# Patient Record
Sex: MALE | Race: WHITE | NOT HISPANIC OR LATINO | URBAN - METROPOLITAN AREA
[De-identification: names, ages, dates, MRNs, and addresses within clinical notes are randomized per-mention and may not be internally consistent; named-entity substitution may affect disease eponyms.]

---

## 2017-06-29 ENCOUNTER — EMERGENCY (EMERGENCY)
Facility: HOSPITAL | Age: 37
LOS: 1 days | Discharge: HOME | End: 2017-06-29
Attending: EMERGENCY MEDICINE | Admitting: EMERGENCY MEDICINE
Payer: SELF-PAY

## 2017-06-29 VITALS
DIASTOLIC BLOOD PRESSURE: 93 MMHG | RESPIRATION RATE: 18 BRPM | HEART RATE: 69 BPM | TEMPERATURE: 99 F | WEIGHT: 190.04 LBS | SYSTOLIC BLOOD PRESSURE: 134 MMHG | OXYGEN SATURATION: 98 %

## 2017-06-29 VITALS
DIASTOLIC BLOOD PRESSURE: 88 MMHG | SYSTOLIC BLOOD PRESSURE: 126 MMHG | OXYGEN SATURATION: 99 % | HEART RATE: 88 BPM | TEMPERATURE: 98 F | RESPIRATION RATE: 18 BRPM
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
## 2017-06-29 DIAGNOSIS — R07.89 OTHER CHEST PAIN: ICD-10-CM

## 2017-06-29 DIAGNOSIS — S01.312D LACERATION WITHOUT FOREIGN BODY OF LEFT EAR, SUBSEQUENT ENCOUNTER: ICD-10-CM

## 2017-06-29 DIAGNOSIS — Z88.0 ALLERGY STATUS TO PENICILLIN: ICD-10-CM

## 2017-06-29 DIAGNOSIS — Z72.0 TOBACCO USE: ICD-10-CM

## 2017-06-29 LAB
ANION GAP SERPL CALC-SCNC: 15 MMOL/L — SIGNIFICANT CHANGE UP (ref 5–17)
BASOPHILS NFR BLD AUTO: 0.6 % — SIGNIFICANT CHANGE UP (ref 0–2)
BUN SERPL-MCNC: 18 MG/DL — SIGNIFICANT CHANGE UP (ref 7–23)
CALCIUM SERPL-MCNC: 9.9 MG/DL — SIGNIFICANT CHANGE UP (ref 8.4–10.5)
CHLORIDE SERPL-SCNC: 100 MMOL/L — SIGNIFICANT CHANGE UP (ref 96–108)
CO2 SERPL-SCNC: 24 MMOL/L — SIGNIFICANT CHANGE UP (ref 22–31)
CREAT SERPL-MCNC: 0.9 MG/DL — SIGNIFICANT CHANGE UP (ref 0.5–1.3)
D DIMER BLD IA.RAPID-MCNC: <150 NG/ML DDU — SIGNIFICANT CHANGE UP
EOSINOPHIL NFR BLD AUTO: 3.2 % — SIGNIFICANT CHANGE UP (ref 0–6)
GLUCOSE SERPL-MCNC: 89 MG/DL — SIGNIFICANT CHANGE UP (ref 70–99)
HCT VFR BLD CALC: 44.2 % — SIGNIFICANT CHANGE UP (ref 39–50)
HGB BLD-MCNC: 15.3 G/DL — SIGNIFICANT CHANGE UP (ref 13–17)
LYMPHOCYTES # BLD AUTO: 33.4 % — SIGNIFICANT CHANGE UP (ref 13–44)
MCHC RBC-ENTMCNC: 30.5 PG — SIGNIFICANT CHANGE UP (ref 27–34)
MCHC RBC-ENTMCNC: 34.6 G/DL — SIGNIFICANT CHANGE UP (ref 32–36)
MCV RBC AUTO: 88.2 FL — SIGNIFICANT CHANGE UP (ref 80–100)
MONOCYTES NFR BLD AUTO: 8.1 % — SIGNIFICANT CHANGE UP (ref 2–14)
NEUTROPHILS NFR BLD AUTO: 54.7 % — SIGNIFICANT CHANGE UP (ref 43–77)
PLATELET # BLD AUTO: 247 K/UL — SIGNIFICANT CHANGE UP (ref 150–400)
POTASSIUM SERPL-MCNC: 4.6 MMOL/L — SIGNIFICANT CHANGE UP (ref 3.5–5.3)
POTASSIUM SERPL-SCNC: 4.6 MMOL/L — SIGNIFICANT CHANGE UP (ref 3.5–5.3)
RBC # BLD: 5.01 M/UL — SIGNIFICANT CHANGE UP (ref 4.2–5.8)
RBC # FLD: 13.2 % — SIGNIFICANT CHANGE UP (ref 10.3–16.9)
SODIUM SERPL-SCNC: 139 MMOL/L — SIGNIFICANT CHANGE UP (ref 135–145)
WBC # BLD: 6.6 K/UL — SIGNIFICANT CHANGE UP (ref 3.8–10.5)
WBC # FLD AUTO: 6.6 K/UL — SIGNIFICANT CHANGE UP (ref 3.8–10.5)

## 2017-06-29 PROCEDURE — 80048 BASIC METABOLIC PNL TOTAL CA: CPT

## 2017-06-29 PROCEDURE — 85379 FIBRIN DEGRADATION QUANT: CPT

## 2017-06-29 PROCEDURE — 99284 EMERGENCY DEPT VISIT MOD MDM: CPT | Mod: 25

## 2017-06-29 PROCEDURE — 93005 ELECTROCARDIOGRAM TRACING: CPT

## 2017-06-29 PROCEDURE — 93010 ELECTROCARDIOGRAM REPORT: CPT

## 2017-06-29 PROCEDURE — 71020: CPT | Mod: 26

## 2017-06-29 PROCEDURE — 85025 COMPLETE CBC W/AUTO DIFF WBC: CPT

## 2017-06-29 PROCEDURE — 99285 EMERGENCY DEPT VISIT HI MDM: CPT | Mod: 25

## 2017-06-29 PROCEDURE — 71275 CT ANGIOGRAPHY CHEST: CPT | Mod: 26

## 2017-06-29 PROCEDURE — 71275 CT ANGIOGRAPHY CHEST: CPT

## 2017-06-29 PROCEDURE — 71046 X-RAY EXAM CHEST 2 VIEWS: CPT

## 2017-06-29 RX ORDER — GABAPENTIN 400 MG/1
0 CAPSULE ORAL
Qty: 0 | Refills: 0 | COMMUNITY

## 2017-06-29 RX ORDER — ALPRAZOLAM 0.25 MG
0 TABLET ORAL
Qty: 0 | Refills: 0 | COMMUNITY

## 2017-06-29 RX ORDER — ALPRAZOLAM 0.25 MG
0.5 TABLET ORAL ONCE
Qty: 0 | Refills: 0 | Status: DISCONTINUED | OUTPATIENT
Start: 2017-06-29 | End: 2017-06-29

## 2017-06-29 RX ORDER — HYDROCODONE BITARTRATE 50 MG/1
0 CAPSULE, EXTENDED RELEASE ORAL
Qty: 0 | Refills: 0 | COMMUNITY

## 2017-06-29 RX ORDER — IBUPROFEN 200 MG
600 TABLET ORAL ONCE
Qty: 0 | Refills: 0 | Status: COMPLETED | OUTPATIENT
Start: 2017-06-29 | End: 2017-06-29

## 2017-06-29 RX ADMIN — Medication 600 MILLIGRAM(S): at 09:31

## 2017-06-29 NOTE — ED ADULT NURSE NOTE - ADDITIONAL PRINTED INSTRUCTIONS GIVEN
Verbalized understanding of all d/c instructions, pt to followup with PMD and return for worsening symptoms

## 2017-06-29 NOTE — ED PROVIDER NOTE - PROGRESS NOTE DETAILS
4 stitches to left upper pinna patricia last saturday 6 days ago Pt had 3 stitches placed to left superior pinna in [place] ([place]) 6 days ago (s/p accidental cut on a sharp surface)and would like the stitches removed. Wound healing well. No signs of infection or discharge. 3 stitches removed without complication.

## 2017-06-29 NOTE — ED PROVIDER NOTE - CONDUCTED A DETAILED DISCUSSION WITH PATIENT AND/OR GUARDIAN REGARDING, MDM
radiology results/need for outpatient follow-up/return to ED if symptoms worsen, persist or questions arise/lab results/need for surgery

## 2017-06-29 NOTE — ED PROVIDER NOTE - SKIN, MLM
Skin normal color for race, warm, dry and intact. No evidence of rash. healing wound noted to left superior pinna with 3 stitches in place. No erythema/ discharge/ swelling or signs of infection.

## 2017-06-29 NOTE — ED PROVIDER NOTE - OBJECTIVE STATEMENT
36 yo male with hx of anxiety, panic attacks, chronic LBP secondary to lumbar spine fracture at work years ago, presents with right sided chest pain that started while he was drinking coffee at home just PTA. Pain is described as sharp and worsens with deep breaths and associated with SOB. Pt also c/o feeling anxious and states "this could be because of panic" and states that sxs are similar to past panic attacks but today's symptoms are worse than in the past. Also c/o dry throat. No risk factors for VTE, no prolonged immobilization or prior hx or family hx of VTE. No leg pain or swelling. No other complaints. 38 yo male with hx of anxiety, panic attacks, chronic LBP secondary to lumbar spine fracture at work years ago, presents with right sided chest pain that started while he was drinking coffee at home just PTA. Pain is described as sharp and worsens with deep breaths and associated with SOB. Pt also c/o feeling anxious and states "this could be because of panic" and states that sxs are similar to past panic attacks but today's symptoms are worse than in the past. Also c/o dry throat. Pt took a flight from [place] (approx 8 hours) a week ago but states he was walking around periodically on the flight. No leg pain or swelling. No other complaints. No cough/ fevers/ chills.

## 2017-06-29 NOTE — ED ADULT TRIAGE NOTE - CHIEF COMPLAINT QUOTE
right sided chest discomfort. Patient reports "I'm having a panic attack and I can't breath" Reports hx of anxiety. Patient speaking in full sentences.

## 2017-06-29 NOTE — ED PROVIDER NOTE - MEDICAL DECISION MAKING DETAILS
36 yo male presents with right sided chest pain that worsens with deep breaths. Labs/ studies noted. EKG WNL. CTA with no PE. Pt feeling better post pain meds. ?MSK etiology. Advised pain meds prn. Incidental CT findings of possible pericardial cyst discussed and pt to f/up outpt.

## 2017-07-22 ENCOUNTER — EMERGENCY (EMERGENCY)
Facility: HOSPITAL | Age: 37
LOS: 1 days | Discharge: HOME | End: 2017-07-22
Attending: EMERGENCY MEDICINE | Admitting: EMERGENCY MEDICINE
Payer: OTHER MISCELLANEOUS

## 2017-07-22 VITALS
OXYGEN SATURATION: 97 % | RESPIRATION RATE: 18 BRPM | DIASTOLIC BLOOD PRESSURE: 74 MMHG | SYSTOLIC BLOOD PRESSURE: 148 MMHG | HEART RATE: 72 BPM

## 2017-07-22 VITALS
SYSTOLIC BLOOD PRESSURE: 133 MMHG | WEIGHT: 195.11 LBS | HEART RATE: 98 BPM | OXYGEN SATURATION: 100 % | TEMPERATURE: 102 F | DIASTOLIC BLOOD PRESSURE: 84 MMHG | RESPIRATION RATE: 20 BRPM | HEIGHT: 68 IN

## 2017-07-22 DIAGNOSIS — L53.9 ERYTHEMATOUS CONDITION, UNSPECIFIED: ICD-10-CM

## 2017-07-22 DIAGNOSIS — M54.5 LOW BACK PAIN: ICD-10-CM

## 2017-07-22 DIAGNOSIS — Z79.899 OTHER LONG TERM (CURRENT) DRUG THERAPY: ICD-10-CM

## 2017-07-22 DIAGNOSIS — Z79.02 LONG TERM (CURRENT) USE OF ANTITHROMBOTICS/ANTIPLATELETS: ICD-10-CM

## 2017-07-22 DIAGNOSIS — Z88.0 ALLERGY STATUS TO PENICILLIN: ICD-10-CM

## 2017-07-22 DIAGNOSIS — R50.9 FEVER, UNSPECIFIED: ICD-10-CM

## 2017-07-22 DIAGNOSIS — Z79.891 LONG TERM (CURRENT) USE OF OPIATE ANALGESIC: ICD-10-CM

## 2017-07-22 LAB
ALBUMIN SERPL ELPH-MCNC: 4.4 G/DL — SIGNIFICANT CHANGE UP (ref 3.3–5)
ALP SERPL-CCNC: 53 U/L — SIGNIFICANT CHANGE UP (ref 40–120)
ALT FLD-CCNC: 47 U/L — HIGH (ref 10–45)
ANION GAP SERPL CALC-SCNC: 13 MMOL/L — SIGNIFICANT CHANGE UP (ref 5–17)
APPEARANCE UR: CLEAR — SIGNIFICANT CHANGE UP
APTT BLD: 31.2 SEC — SIGNIFICANT CHANGE UP (ref 27.5–37.4)
AST SERPL-CCNC: 27 U/L — SIGNIFICANT CHANGE UP (ref 10–40)
BASOPHILS NFR BLD AUTO: 0.2 % — SIGNIFICANT CHANGE UP (ref 0–2)
BILIRUB SERPL-MCNC: 0.2 MG/DL — SIGNIFICANT CHANGE UP (ref 0.2–1.2)
BILIRUB UR-MCNC: NEGATIVE — SIGNIFICANT CHANGE UP
BUN SERPL-MCNC: 12 MG/DL — SIGNIFICANT CHANGE UP (ref 7–23)
CALCIUM SERPL-MCNC: 9.3 MG/DL — SIGNIFICANT CHANGE UP (ref 8.4–10.5)
CHLORIDE SERPL-SCNC: 98 MMOL/L — SIGNIFICANT CHANGE UP (ref 96–108)
CO2 SERPL-SCNC: 26 MMOL/L — SIGNIFICANT CHANGE UP (ref 22–31)
COLOR SPEC: YELLOW — SIGNIFICANT CHANGE UP
CREAT SERPL-MCNC: 0.9 MG/DL — SIGNIFICANT CHANGE UP (ref 0.5–1.3)
DIFF PNL FLD: (no result)
EOSINOPHIL NFR BLD AUTO: 0.2 % — SIGNIFICANT CHANGE UP (ref 0–6)
GLUCOSE SERPL-MCNC: 96 MG/DL — SIGNIFICANT CHANGE UP (ref 70–99)
GLUCOSE UR QL: NEGATIVE — SIGNIFICANT CHANGE UP
HCT VFR BLD CALC: 46.1 % — SIGNIFICANT CHANGE UP (ref 39–50)
HGB BLD-MCNC: 15 G/DL — SIGNIFICANT CHANGE UP (ref 13–17)
INR BLD: 1.09 — SIGNIFICANT CHANGE UP (ref 0.88–1.16)
KETONES UR-MCNC: NEGATIVE — SIGNIFICANT CHANGE UP
LACTATE SERPL-SCNC: 2.1 MMOL/L — HIGH (ref 0.5–2)
LEUKOCYTE ESTERASE UR-ACNC: NEGATIVE — SIGNIFICANT CHANGE UP
LYMPHOCYTES # BLD AUTO: 14.4 % — SIGNIFICANT CHANGE UP (ref 13–44)
MCHC RBC-ENTMCNC: 29.5 PG — SIGNIFICANT CHANGE UP (ref 27–34)
MCHC RBC-ENTMCNC: 32.5 G/DL — SIGNIFICANT CHANGE UP (ref 32–36)
MCV RBC AUTO: 90.6 FL — SIGNIFICANT CHANGE UP (ref 80–100)
MONOCYTES NFR BLD AUTO: 11.4 % — SIGNIFICANT CHANGE UP (ref 2–14)
NEUTROPHILS NFR BLD AUTO: 73.8 % — SIGNIFICANT CHANGE UP (ref 43–77)
NITRITE UR-MCNC: NEGATIVE — SIGNIFICANT CHANGE UP
PH UR: 7 — SIGNIFICANT CHANGE UP (ref 5–8)
PLATELET # BLD AUTO: 180 K/UL — SIGNIFICANT CHANGE UP (ref 150–400)
POTASSIUM SERPL-MCNC: 4.2 MMOL/L — SIGNIFICANT CHANGE UP (ref 3.5–5.3)
POTASSIUM SERPL-SCNC: 4.2 MMOL/L — SIGNIFICANT CHANGE UP (ref 3.5–5.3)
PROT SERPL-MCNC: 8.3 G/DL — SIGNIFICANT CHANGE UP (ref 6–8.3)
PROT UR-MCNC: NEGATIVE MG/DL — SIGNIFICANT CHANGE UP
PROTHROM AB SERPL-ACNC: 12.1 SEC — SIGNIFICANT CHANGE UP (ref 9.8–12.7)
RAPID RVP RESULT: SIGNIFICANT CHANGE UP
RBC # BLD: 5.09 M/UL — SIGNIFICANT CHANGE UP (ref 4.2–5.8)
RBC # FLD: 13.4 % — SIGNIFICANT CHANGE UP (ref 10.3–16.9)
SODIUM SERPL-SCNC: 137 MMOL/L — SIGNIFICANT CHANGE UP (ref 135–145)
SP GR SPEC: 1.02 — SIGNIFICANT CHANGE UP (ref 1–1.03)
UROBILINOGEN FLD QL: 1 E.U./DL — SIGNIFICANT CHANGE UP
WBC # BLD: 13.4 K/UL — HIGH (ref 3.8–10.5)
WBC # FLD AUTO: 13.4 K/UL — HIGH (ref 3.8–10.5)

## 2017-07-22 PROCEDURE — 71010: CPT | Mod: 26

## 2017-07-22 PROCEDURE — 99285 EMERGENCY DEPT VISIT HI MDM: CPT

## 2017-07-22 PROCEDURE — 99282 EMERGENCY DEPT VISIT SF MDM: CPT

## 2017-07-22 PROCEDURE — 72149 MRI LUMBAR SPINE W/DYE: CPT | Mod: 26

## 2017-07-22 RX ORDER — AZITHROMYCIN 500 MG/1
500 TABLET, FILM COATED ORAL ONCE
Qty: 0 | Refills: 0 | Status: COMPLETED | OUTPATIENT
Start: 2017-07-22 | End: 2017-07-22

## 2017-07-22 RX ORDER — SODIUM CHLORIDE 9 MG/ML
1000 INJECTION INTRAMUSCULAR; INTRAVENOUS; SUBCUTANEOUS ONCE
Qty: 0 | Refills: 0 | Status: COMPLETED | OUTPATIENT
Start: 2017-07-22 | End: 2017-07-22

## 2017-07-22 RX ORDER — CEFTRIAXONE 500 MG/1
1 INJECTION, POWDER, FOR SOLUTION INTRAMUSCULAR; INTRAVENOUS ONCE
Qty: 0 | Refills: 0 | Status: COMPLETED | OUTPATIENT
Start: 2017-07-22 | End: 2017-07-22

## 2017-07-22 RX ORDER — OXYCODONE AND ACETAMINOPHEN 5; 325 MG/1; MG/1
2 TABLET ORAL ONCE
Qty: 0 | Refills: 0 | Status: DISCONTINUED | OUTPATIENT
Start: 2017-07-22 | End: 2017-07-22

## 2017-07-22 RX ORDER — ONDANSETRON 8 MG/1
4 TABLET, FILM COATED ORAL ONCE
Qty: 0 | Refills: 0 | Status: COMPLETED | OUTPATIENT
Start: 2017-07-22 | End: 2017-07-22

## 2017-07-22 RX ADMIN — OXYCODONE AND ACETAMINOPHEN 2 TABLET(S): 5; 325 TABLET ORAL at 19:41

## 2017-07-22 RX ADMIN — AZITHROMYCIN 255 MILLIGRAM(S): 500 TABLET, FILM COATED ORAL at 21:00

## 2017-07-22 RX ADMIN — CEFTRIAXONE 100 GRAM(S): 500 INJECTION, POWDER, FOR SOLUTION INTRAMUSCULAR; INTRAVENOUS at 20:32

## 2017-07-22 RX ADMIN — ONDANSETRON 4 MILLIGRAM(S): 8 TABLET, FILM COATED ORAL at 21:44

## 2017-07-22 RX ADMIN — SODIUM CHLORIDE 1000 MILLILITER(S): 9 INJECTION INTRAMUSCULAR; INTRAVENOUS; SUBCUTANEOUS at 20:48

## 2017-07-22 RX ADMIN — SODIUM CHLORIDE 1000 MILLILITER(S): 9 INJECTION INTRAMUSCULAR; INTRAVENOUS; SUBCUTANEOUS at 19:41

## 2017-07-22 RX ADMIN — OXYCODONE AND ACETAMINOPHEN 2 TABLET(S): 5; 325 TABLET ORAL at 20:32

## 2017-07-22 NOTE — ED PROVIDER NOTE - MUSCULOSKELETAL, MLM
Spine appears normal, +midline lumbar tenderness, no erythema, range of motion is not limited, no muscle or joint tenderness

## 2017-07-22 NOTE — ED ADULT TRIAGE NOTE - CHIEF COMPLAINT QUOTE
"I had a epidural last week and then two days ago my back pain got worse, I started to have fevers of 102 with also difficulty goes to have urine and poop". Pt states he has to strain to pee and have bowel movement, denies numbness and tingling to extremities.

## 2017-07-22 NOTE — ED PROVIDER NOTE - OBJECTIVE STATEMENT
38 y/o m hx LBP s/p epidural injection 7/12/17 by pain management doctor presents c/o fever for the past 2 days, worsening low back pain.  Pt stating yesterday began spiking fever which has been intermittent 38 y/o m hx LBP s/p epidural injection 7/12/17 by pain management doctor presents c/o fever for the past 2 days, worsening low back pain.  Pt stating yesterday began spiking fever which has been intermittent since yesterday.  Pt stating pain in back feels worse than previous.  Pt also reporting mild sore throat today.  Pt denies numbness/tingling to ext, weakness, saddle anesthesia, bowel/bladder incontinence, all other ROS negative.

## 2017-07-22 NOTE — ED PROVIDER NOTE - ATTENDING CONTRIBUTION TO CARE
36 y/o m hx LBP s/p epidural injection 7/12/17 who p/w worsening LBP and fever. Pt febrile and meeting sepsis criteria so w/u per sepsis protocol, covered with abx and MRi ordered to r/u epidural abscess. MRI, CXR, RVP and UA ordered with no infectious source found. Pt and pain improved after treatment in the ED and stable for DC. Suspect viral etiology.

## 2017-07-22 NOTE — CONSULT NOTE ADULT - SUBJECTIVE AND OBJECTIVE BOX
HISTORY OF PRESENT ILLNESS: 37M presents with above chief complaints.  Sustained lumbar fracture in work related incident 5 years ago.  Since then has had back pain consistently.  Underwent epidural steroid injection (5th one in the last 2 years) approximately one week ago with good effect.  Then had recurrence of back pain over the last 2 days, non-radiating.  Associated with fevers today which prompted visit to emergency room.  Associated urinary/bowel retention (patient had multiple narcotic medications presently).  Denies incontinence, numbness/tingling/weakness of extremities.      PAST MEDICAL & SURGICAL HISTORY:  L5 vertebral fracture  Back pain  No significant past surgical history    FAMILY HISTORY    Allergies    penicillin (Rash)    Intolerances        REVIEW OF SYSTEMS  General:	see HPI  Skin/Breast: denies  Ophthalmologic: denies  ENMT:	denies  Respiratory and Thorax: denies  Cardiovascular:	denies  Gastrointestinal:	 see HPI  Genitourinary:	see HPI  Musculoskeletal:	see HPI  Neurological:	denies  Psychiatric:	denies:	      MEDICATIONS:  Antibiotics:  cefTRIAXone   IVPB 1 Gram(s) IV Intermittent Once  azithromycin  IVPB 500 milliGRAM(s) IV Intermittent once    Neuro:    Anticoagulation:    OTHER:    IVF:  sodium chloride 0.9% Bolus 1000 milliLiter(s) IV Bolus once      Vital Signs Last 24 Hrs  T(C): 38.7 (2017 19:23), Max: 38.9 (2017 18:28)  T(F): 101.6 (2017 19:23), Max: 102 (2017 18:28)  HR: 88 (2017 18:45) (88 - 98)  BP: 176/88 (2017 18:45) (133/84 - 176/88)  BP(mean): --  RR: 18 (2017 18:45) (18 - 20)  SpO2: 98% (2017 18:45) (98% - 100%)    PHYSICAL EXAM:  Constitutional: well-developed well-groomed in no acute distress    Neurological: A/Ox3, follows commands, speech clear  MURILLO 5/5 strength  Sensation intact light touch all dermatomes      LABS:                        15.0   13.4  )-----------( 180      ( 2017 19:07 )             46.1     07-22    137  |  98  |  12  ----------------------------<  96  4.2   |  26  |  0.90    Ca    9.3      2017 19:07    TPro  8.3  /  Alb  4.4  /  TBili  0.2  /  DBili  x   /  AST  27  /  ALT  47<H>  /  AlkPhos  53  07-22    PT/INR - ( 2017 19:07 )   PT: 12.1 sec;   INR: 1.09          PTT - ( 2017 19:07 )  PTT:31.2 sec  Urinalysis Basic - ( 2017 19:08 )    Color: Yellow / Appearance: Clear / S.020 / pH: x  Gluc: x / Ketone: NEGATIVE  / Bili: NEGATIVE / Urobili: 1.0 E.U./dL   Blood: x / Protein: NEGATIVE mg/dL / Nitrite: NEGATIVE   Leuk Esterase: NEGATIVE / RBC: < 5 /HPF / WBC < 5 /HPF   Sq Epi: x / Non Sq Epi: Rare /HPF / Bacteria: Present /HPF      CULTURES:      RADIOLOGY & ADDITIONAL STUDIES: pending    37M w/ back pain/fevers s/p MONTY HISTORY OF PRESENT ILLNESS: 37M presents with above chief complaints.  Sustained lumbar fracture in work related incident 5 years ago.  Since then has had back pain consistently.  Underwent epidural steroid injection (5th one in the last 2 years) approximately one week ago with good effect.  Then had recurrence of back pain over the last 2 days, non-radiating.  Associated with fevers today which prompted visit to emergency room.  Associated urinary/bowel retention (patient had multiple narcotic medications presently).  Denies incontinence, numbness/tingling/weakness of extremities.      PAST MEDICAL & SURGICAL HISTORY:  L5 vertebral fracture  Back pain  No significant past surgical history    FAMILY HISTORY    Allergies    penicillin (Rash)    Intolerances        REVIEW OF SYSTEMS  General:	see HPI  Skin/Breast: denies  Ophthalmologic: denies  ENMT:	denies  Respiratory and Thorax: denies  Cardiovascular:	denies  Gastrointestinal:	 see HPI  Genitourinary:	see HPI  Musculoskeletal:	see HPI  Neurological:	denies  Psychiatric:	denies:	      MEDICATIONS:  Antibiotics:  cefTRIAXone   IVPB 1 Gram(s) IV Intermittent Once  azithromycin  IVPB 500 milliGRAM(s) IV Intermittent once    Neuro:    Anticoagulation:    OTHER:    IVF:  sodium chloride 0.9% Bolus 1000 milliLiter(s) IV Bolus once      Vital Signs Last 24 Hrs  T(C): 38.7 (2017 19:23), Max: 38.9 (2017 18:28)  T(F): 101.6 (2017 19:23), Max: 102 (2017 18:28)  HR: 88 (2017 18:45) (88 - 98)  BP: 176/88 (2017 18:45) (133/84 - 176/88)  BP(mean): --  RR: 18 (2017 18:45) (18 - 20)  SpO2: 98% (2017 18:45) (98% - 100%)    PHYSICAL EXAM:  Constitutional: well-developed well-groomed in no acute distress    Neurological: A/Ox3, follows commands, speech clear  MURILLO 5/5 strength  Sensation intact light touch all dermatomes      LABS:                        15.0   13.4  )-----------( 180      ( 2017 19:07 )             46.1     07-22    137  |  98  |  12  ----------------------------<  96  4.2   |  26  |  0.90    Ca    9.3      2017 19:07    TPro  8.3  /  Alb  4.4  /  TBili  0.2  /  DBili  x   /  AST  27  /  ALT  47<H>  /  AlkPhos  53  07-22    PT/INR - ( 2017 19:07 )   PT: 12.1 sec;   INR: 1.09          PTT - ( 2017 19:07 )  PTT:31.2 sec  Urinalysis Basic - ( 2017 19:08 )    Color: Yellow / Appearance: Clear / S.020 / pH: x  Gluc: x / Ketone: NEGATIVE  / Bili: NEGATIVE / Urobili: 1.0 E.U./dL   Blood: x / Protein: NEGATIVE mg/dL / Nitrite: NEGATIVE   Leuk Esterase: NEGATIVE / RBC: < 5 /HPF / WBC < 5 /HPF   Sq Epi: x / Non Sq Epi: Rare /HPF / Bacteria: Present /HPF      CULTURES:      RADIOLOGY & ADDITIONAL STUDIES: no abnormal enhancement or evidence of epidural collection    37M w/ back pain/fevers s/p MONTY  -No surgical intervention at this time  -Pain control, infectious workup  -No further recommendations at this time, reconsult as needed  -Case, imaging, and plan discussed with Dr. Dasilva

## 2017-07-22 NOTE — ED PROVIDER NOTE - MEDICAL DECISION MAKING DETAILS
36 y/o m back pain, fever s/p epidural injection 7/12/17; no neuro deficits although presentation suspicious for epidural abscess.  Spine consulted, MRI lumbar spine with IV contrast ordered.  Pt given fluids, covered emperically with ceftriaxone and azithromycin.  Will f/u MRI and neurosurgery recommendations.

## 2017-07-22 NOTE — ED ADULT NURSE REASSESSMENT NOTE - NS ED NURSE REASSESS COMMENT FT1
2100 - c/o dizziness nausea, from Percocet? x 2 tabs taken on empty stomach 2 tabs at 1945, medicated as ordered

## 2017-07-23 LAB
CULTURE RESULTS: NO GROWTH — SIGNIFICANT CHANGE UP
LACTATE SERPL-SCNC: 0.6 MMOL/L — SIGNIFICANT CHANGE UP (ref 0.5–2)
SPECIMEN SOURCE: SIGNIFICANT CHANGE UP

## 2017-07-23 PROCEDURE — 81001 URINALYSIS AUTO W/SCOPE: CPT

## 2017-07-23 PROCEDURE — 85610 PROTHROMBIN TIME: CPT

## 2017-07-23 PROCEDURE — 36415 COLL VENOUS BLD VENIPUNCTURE: CPT

## 2017-07-23 PROCEDURE — 85025 COMPLETE CBC W/AUTO DIFF WBC: CPT

## 2017-07-23 PROCEDURE — 87486 CHLMYD PNEUM DNA AMP PROBE: CPT

## 2017-07-23 PROCEDURE — 71045 X-RAY EXAM CHEST 1 VIEW: CPT

## 2017-07-23 PROCEDURE — 87798 DETECT AGENT NOS DNA AMP: CPT

## 2017-07-23 PROCEDURE — 87040 BLOOD CULTURE FOR BACTERIA: CPT

## 2017-07-23 PROCEDURE — 83605 ASSAY OF LACTIC ACID: CPT

## 2017-07-23 PROCEDURE — 96375 TX/PRO/DX INJ NEW DRUG ADDON: CPT

## 2017-07-23 PROCEDURE — A9585: CPT

## 2017-07-23 PROCEDURE — 87633 RESP VIRUS 12-25 TARGETS: CPT

## 2017-07-23 PROCEDURE — 72149 MRI LUMBAR SPINE W/DYE: CPT

## 2017-07-23 PROCEDURE — 87086 URINE CULTURE/COLONY COUNT: CPT

## 2017-07-23 PROCEDURE — 80053 COMPREHEN METABOLIC PANEL: CPT

## 2017-07-23 PROCEDURE — 96374 THER/PROPH/DIAG INJ IV PUSH: CPT

## 2017-07-23 PROCEDURE — 87581 M.PNEUMON DNA AMP PROBE: CPT

## 2017-07-23 PROCEDURE — 99284 EMERGENCY DEPT VISIT MOD MDM: CPT | Mod: 25

## 2017-07-23 PROCEDURE — 85730 THROMBOPLASTIN TIME PARTIAL: CPT

## 2017-07-23 RX ORDER — KETOROLAC TROMETHAMINE 30 MG/ML
30 SYRINGE (ML) INJECTION ONCE
Qty: 0 | Refills: 0 | Status: DISCONTINUED | OUTPATIENT
Start: 2017-07-23 | End: 2017-07-23

## 2017-07-23 RX ORDER — AZITHROMYCIN 500 MG/1
1 TABLET, FILM COATED ORAL
Qty: 6 | Refills: 0 | OUTPATIENT
Start: 2017-07-23

## 2017-07-23 RX ORDER — METOCLOPRAMIDE HCL 10 MG
10 TABLET ORAL ONCE
Qty: 0 | Refills: 0 | Status: COMPLETED | OUTPATIENT
Start: 2017-07-23 | End: 2017-07-23

## 2017-07-23 RX ADMIN — Medication 30 MILLIGRAM(S): at 01:18

## 2017-07-23 RX ADMIN — Medication 10 MILLIGRAM(S): at 01:18

## 2017-07-28 LAB
CULTURE RESULTS: SIGNIFICANT CHANGE UP
CULTURE RESULTS: SIGNIFICANT CHANGE UP
SPECIMEN SOURCE: SIGNIFICANT CHANGE UP
SPECIMEN SOURCE: SIGNIFICANT CHANGE UP

## 2018-04-29 NOTE — ED PROVIDER NOTE - INTERPRETATION
normal sinus rhythm, Normal axis, Normal NJ interval and QRS complex. There are no acute ischemic ST or T-wave changes. gradual onset
